# Patient Record
Sex: MALE | Race: WHITE | URBAN - METROPOLITAN AREA
[De-identification: names, ages, dates, MRNs, and addresses within clinical notes are randomized per-mention and may not be internally consistent; named-entity substitution may affect disease eponyms.]

---

## 2017-01-05 ENCOUNTER — OFFICE VISIT (OUTPATIENT)
Dept: ORTHOPEDICS | Facility: CLINIC | Age: 67
End: 2017-01-05

## 2017-01-05 DIAGNOSIS — Z96.659 POSTOPERATIVE STIFFNESS OF TOTAL KNEE REPLACEMENT, SUBSEQUENT ENCOUNTER: ICD-10-CM

## 2017-01-05 DIAGNOSIS — M25.669 POSTOPERATIVE STIFFNESS OF TOTAL KNEE REPLACEMENT, SUBSEQUENT ENCOUNTER: ICD-10-CM

## 2017-01-05 DIAGNOSIS — M25.562 LEFT KNEE PAIN: Primary | ICD-10-CM

## 2017-01-05 DIAGNOSIS — Z98.890 H/O ARTHROSCOPIC KNEE SURGERY: ICD-10-CM

## 2017-01-05 DIAGNOSIS — M25.562 PAIN AND SWELLING OF LEFT KNEE: ICD-10-CM

## 2017-01-05 DIAGNOSIS — T84.89XD POSTOPERATIVE STIFFNESS OF TOTAL KNEE REPLACEMENT, SUBSEQUENT ENCOUNTER: ICD-10-CM

## 2017-01-05 DIAGNOSIS — M25.462 PAIN AND SWELLING OF LEFT KNEE: ICD-10-CM

## 2017-01-05 NOTE — Clinical Note
1/5/2017       RE: Tristen Ellis  [address]  [facility] [address]     Dear Colleague,    Thank you for referring your patient, Tristen Ellis, to the [facility] at [facility]. Please see a copy of my visit note below.    HISTORY OF PRESENT ILLNESS:  Tristen is seen in followup for a right trigger finger release and also a left knee diagnostic arthroscopy, status post TKA.  He reports he is doing well in both regards.  He has no new concerns, feels that his motion is improved, he still has some occasional swelling about his knee but this is activity related.  Portals are healed, benign and intact.  Sutures were removed both at the knee and of the trigger finger.  He can advance his activities with his trigger doing gentle stretching and range of motion as tolerated.  He will follow up with us as needed.           Again, thank you for allowing me to participate in the care of your patient.      Sincerely,    JUAN DIEGO Griggs CNP

## 2017-01-12 NOTE — PROGRESS NOTES
HISTORY OF PRESENT ILLNESS:  Tristen is seen in followup for a right trigger finger release and also a left knee diagnostic arthroscopy, status post TKA.  He reports he is doing well in both regards.  He has no new concerns, feels that his motion is improved, he still has some occasional swelling about his knee but this is activity related.  Portals are healed, benign and intact.  Sutures were removed both at the knee and of the trigger finger.  He can advance his activities with his trigger doing gentle stretching and range of motion as tolerated.  He will follow up with us as needed.

## 2020-03-10 ENCOUNTER — HEALTH MAINTENANCE LETTER (OUTPATIENT)
Age: 70
End: 2020-03-10

## 2020-12-27 ENCOUNTER — HEALTH MAINTENANCE LETTER (OUTPATIENT)
Age: 70
End: 2020-12-27

## 2021-04-24 ENCOUNTER — HEALTH MAINTENANCE LETTER (OUTPATIENT)
Age: 71
End: 2021-04-24

## 2021-06-01 ENCOUNTER — RECORDS - HEALTHEAST (OUTPATIENT)
Dept: ADMINISTRATIVE | Facility: CLINIC | Age: 71
End: 2021-06-01

## 2021-06-02 ENCOUNTER — RECORDS - HEALTHEAST (OUTPATIENT)
Dept: ADMINISTRATIVE | Facility: CLINIC | Age: 71
End: 2021-06-02

## 2021-10-09 ENCOUNTER — HEALTH MAINTENANCE LETTER (OUTPATIENT)
Age: 71
End: 2021-10-09

## 2022-05-16 ENCOUNTER — HEALTH MAINTENANCE LETTER (OUTPATIENT)
Age: 72
End: 2022-05-16

## 2022-09-11 ENCOUNTER — HEALTH MAINTENANCE LETTER (OUTPATIENT)
Age: 72
End: 2022-09-11

## 2023-06-03 ENCOUNTER — HEALTH MAINTENANCE LETTER (OUTPATIENT)
Age: 73
End: 2023-06-03